# Patient Record
(demographics unavailable — no encounter records)

---

## 2025-01-24 NOTE — HISTORY OF PRESENT ILLNESS
[FreeTextEntry1] : CHIEF COMPLAINT: REFERRED BY:   HISTORY OF PRESENTING ILLNESS: The patient is a _ year old _ being seen in the office today for evaluation of     Hypo/Hyperthyroid symptoms: No constipation or diarrhea. No weight gain or weight loss. No heat or cold intolerance. No fatigue. No palpitations. No tremors. No anxiety or depression. No mental slowing. No skin or hair changes. No facial or peripheral edema.   Compressive symptoms: No neck swelling, no dysphagia, no dyspnea, no change in voice. Eye symptoms: No proptosis or eye swelling. No stare, no lid lag.   No family history of thyroid disease or thyroid cancer. No history of biotin intake. No personal history of radiation exposure in the head and neck area. No known history of thyroid nodules.  No prior treatment with thyroid medications.    Menstrual History: